# Patient Record
Sex: FEMALE | Race: WHITE | Employment: FULL TIME | URBAN - METROPOLITAN AREA
[De-identification: names, ages, dates, MRNs, and addresses within clinical notes are randomized per-mention and may not be internally consistent; named-entity substitution may affect disease eponyms.]

---

## 2021-06-07 ENCOUNTER — OFFICE VISIT (OUTPATIENT)
Dept: FAMILY MEDICINE CLINIC | Age: 41
End: 2021-06-07
Payer: COMMERCIAL

## 2021-06-07 VITALS
DIASTOLIC BLOOD PRESSURE: 86 MMHG | TEMPERATURE: 97.6 F | HEIGHT: 60 IN | HEART RATE: 89 BPM | OXYGEN SATURATION: 97 % | WEIGHT: 117 LBS | BODY MASS INDEX: 22.97 KG/M2 | RESPIRATION RATE: 17 BRPM | SYSTOLIC BLOOD PRESSURE: 131 MMHG

## 2021-06-07 DIAGNOSIS — L85.3 DRY SKIN: Primary | ICD-10-CM

## 2021-06-07 DIAGNOSIS — R42 DIZZINESS: ICD-10-CM

## 2021-06-07 DIAGNOSIS — L67.9 HAIR CHANGES: ICD-10-CM

## 2021-06-07 PROCEDURE — 99203 OFFICE O/P NEW LOW 30 MIN: CPT | Performed by: FAMILY MEDICINE

## 2021-06-07 NOTE — PROGRESS NOTES
1. Have you been to the ER, urgent care clinic since your last visit? Hospitalized since your last visit? No    2. Have you seen or consulted any other health care providers outside of the [address] [place] [address] since your last visit? Include any pap smears or colon screening. Yes, Dermatology     Health Maintenance Due   Topic Date Due    Hepatitis C Screening  Never done    DTaP/Tdap/Td series (1 - Tdap) Never done    PAP AKA CERVICAL CYTOLOGY  Never done     Chief Complaint   Patient presents with    Establish Care     Pt presents to be having dry skin, and experience changes in her hair and pt states BP has been lower.

## 2021-06-07 NOTE — PROGRESS NOTES
Chief Complaint   Patient presents with   [facility].     Pt is seen today is establish care. Pt has been having symptoms of dry skin since 2017. Pt reports she has had dry skin, and experienced changes in her hair and pt states BP has been lower than normal on multiple recent evalautions. Pt has been to the dermatologist due to dry skin, also noted change in hair growth, grows in spirally. Pt has noted hair breakage. Pt has been struggling with dry scalp. First dermatologists diagnosed possible eczema, but second dermatologist said not eczema, recommended blood testing. Pt also started noticing horizontal ridges in nails, tougher and less bendable. Pt reports that her legs have become so dry that they get irritated with shaving, red bumps. Recently pt has had some episodes of lightheadedness, dizziness, some palpitations. Pt also has had several low BP recently. Pt is concerned about possible thyroid problems. Subjective: (As above and below)     Chief Complaint   Patient presents with   [facility].     she is a 45y.o. year old female who presents for evaluation. Reviewed PmHx, RxHx, FmHx, SocHx, AllgHx and updated in chart.     Review of Systems - negative except as listed above    Objective:     Vitals:    06/07/21 1438   BP: 131/86   Pulse: 89   Resp: 17   Temp: 97.6 °F (36.4 °C)   TempSrc: Oral   SpO2: 97%   Weight: 117 lb (53.1 kg)   Height: 5' 0.25\" (1.53 m)     Physical Examination: General appearance - alert, well appearing, and in no distress  Mental status - normal mood, behavior, speech, dress, motor activity, and thought processes  Ears - bilateral TM's and external ear canals normal  Chest - clear to auscultation, no wheezes, rales or rhonchi, symmetric air entry  Heart - normal rate, regular rhythm, normal S1, S2, no murmurs, rubs, clicks or gallops  Musculoskeletal - no joint tenderness, deformity or swelling  Extremities - peripheral pulses normal, no pedal edema, no clubbing or cyanosis  Skin - dry skin on legs, some sandpapery erythema on left calf  Assessment/ Plan:   1. Dry skin  -check labs  - METABOLIC PANEL, COMPREHENSIVE; Future  - CBC W/O DIFF; Future  - VITAMIN D, 25 HYDROXY; Future  - THYROID CASCADE PROFILE; Future  - IRON PROFILE; Future  - FERRITIN; Future    2. Hair changes  - METABOLIC PANEL, COMPREHENSIVE; Future  - CBC W/O DIFF; Future  - VITAMIN D, 25 HYDROXY; Future  - THYROID CASCADE PROFILE; Future  - IRON PROFILE; Future  - FERRITIN; Future    3. Dizziness  - METABOLIC PANEL, COMPREHENSIVE; Future  - CBC W/O DIFF; Future  - VITAMIN D, 25 HYDROXY; Future  - THYROID CASCADE PROFILE; Future  - IRON PROFILE; Future  - FERRITIN; Future       I have discussed the diagnosis with the patient and the intended plan as seen in the above orders. The patient has received an after-visit summary and questions were answered concerning future plans.      Medication Side Effects and Warnings were discussed with patient: yes  Patient Labs were reviewed: yes  Patient Past Records were reviewed:  yes    Dana Bo M.D.

## 2021-06-08 LAB
25(OH)D3 SERPL-MCNC: 21.4 NG/ML (ref 30–100)
ALBUMIN SERPL-MCNC: 3.9 G/DL (ref 3.5–5)
ALBUMIN/GLOB SERPL: 1.1 {RATIO} (ref 1.1–2.2)
ALP SERPL-CCNC: 40 U/L (ref 45–117)
ALT SERPL-CCNC: 22 U/L (ref 12–78)
ANION GAP SERPL CALC-SCNC: 6 MMOL/L (ref 5–15)
AST SERPL-CCNC: 22 U/L (ref 15–37)
BILIRUB SERPL-MCNC: 0.3 MG/DL (ref 0.2–1)
BUN SERPL-MCNC: 18 MG/DL (ref 6–20)
BUN/CREAT SERPL: 22 (ref 12–20)
CALCIUM SERPL-MCNC: 9.4 MG/DL (ref 8.5–10.1)
CHLORIDE SERPL-SCNC: 107 MMOL/L (ref 97–108)
CO2 SERPL-SCNC: 26 MMOL/L (ref 21–32)
CREAT SERPL-MCNC: 0.82 MG/DL (ref 0.55–1.02)
ERYTHROCYTE [DISTWIDTH] IN BLOOD BY AUTOMATED COUNT: 11.9 % (ref 11.5–14.5)
FERRITIN SERPL-MCNC: 73 NG/ML (ref 8–252)
GLOBULIN SER CALC-MCNC: 3.5 G/DL (ref 2–4)
GLUCOSE SERPL-MCNC: 82 MG/DL (ref 65–100)
HCT VFR BLD AUTO: 42 % (ref 35–47)
HCV AB SERPL QL IA: NONREACTIVE
HCV COMMENT,HCGAC: NORMAL
HGB BLD-MCNC: 14.1 G/DL (ref 11.5–16)
IRON SATN MFR SERPL: 30 % (ref 20–50)
IRON SERPL-MCNC: 90 UG/DL (ref 35–150)
MCH RBC QN AUTO: 30.7 PG (ref 26–34)
MCHC RBC AUTO-ENTMCNC: 33.6 G/DL (ref 30–36.5)
MCV RBC AUTO: 91.5 FL (ref 80–99)
NRBC # BLD: 0 K/UL (ref 0–0.01)
NRBC BLD-RTO: 0 PER 100 WBC
PLATELET # BLD AUTO: 153 K/UL (ref 150–400)
PMV BLD AUTO: 12 FL (ref 8.9–12.9)
POTASSIUM SERPL-SCNC: 3.9 MMOL/L (ref 3.5–5.1)
PROT SERPL-MCNC: 7.4 G/DL (ref 6.4–8.2)
RBC # BLD AUTO: 4.59 M/UL (ref 3.8–5.2)
SODIUM SERPL-SCNC: 139 MMOL/L (ref 136–145)
TIBC SERPL-MCNC: 299 UG/DL (ref 250–450)
WBC # BLD AUTO: 5.8 K/UL (ref 3.6–11)

## 2021-06-09 LAB — TSH SERPL-ACNC: 3.81 UIU/ML (ref 0.45–4.5)

## 2021-06-11 ENCOUNTER — TELEPHONE (OUTPATIENT)
Dept: FAMILY MEDICINE CLINIC | Age: 41
End: 2021-06-11

## 2021-06-11 DIAGNOSIS — L67.9 HAIR CHANGES: ICD-10-CM

## 2021-06-11 DIAGNOSIS — L85.3 DRY SKIN: Primary | ICD-10-CM

## 2021-06-11 NOTE — TELEPHONE ENCOUNTER
Patient calling to get lab results, says that she doesn't know what to do after reading results in [facility].  Please call her at [phone]

## 2021-06-11 NOTE — TELEPHONE ENCOUNTER
verified. Pt wants to know what is the next steps, since she is still feeling the same way that was discussed at visit. She was wondering if additional lab testing is needed for the thyroid r/t family hx. She discussed something about secondary thyroid symptom.

## 2021-06-14 ENCOUNTER — TELEPHONE (OUTPATIENT)
Dept: FAMILY MEDICINE CLINIC | Age: 41
End: 2021-06-14

## 2021-06-14 NOTE — TELEPHONE ENCOUNTER
Please advise pt that other than low vitamin D levels no other abnormalities were found. Recommend endocrinology referral for further evaluation of symptoms. Referral written.

## 2021-06-14 NOTE — TELEPHONE ENCOUNTER
Pt called to update her email address to send referral to. It should be [email or web address]    Also please call pt to discuss last visit. Pt read over AVS and feels that she's being referred for further thyroid testing because she asked for it. Pt wants to make sure that Dr. Kaden Tipton is on the same page as far as next steps for treatment.

## 2021-06-14 NOTE — TELEPHONE ENCOUNTER
Pt notified and voiced understanding. Pt requested referral to be emailed to [email or web address]

## 2023-03-01 ENCOUNTER — TELEPHONE (OUTPATIENT)
Dept: FAMILY MEDICINE CLINIC | Age: 43
End: 2023-03-01

## 2023-03-01 ENCOUNTER — OFFICE VISIT (OUTPATIENT)
Dept: FAMILY MEDICINE CLINIC | Age: 43
End: 2023-03-01
Payer: COMMERCIAL

## 2023-03-01 VITALS
HEIGHT: 60 IN | OXYGEN SATURATION: 98 % | BODY MASS INDEX: 22.1 KG/M2 | TEMPERATURE: 97 F | RESPIRATION RATE: 16 BRPM | WEIGHT: 112.6 LBS | HEART RATE: 83 BPM | SYSTOLIC BLOOD PRESSURE: 122 MMHG | DIASTOLIC BLOOD PRESSURE: 84 MMHG

## 2023-03-01 DIAGNOSIS — E53.8 B12 DEFICIENCY: ICD-10-CM

## 2023-03-01 DIAGNOSIS — E55.9 VITAMIN D DEFICIENCY: ICD-10-CM

## 2023-03-01 DIAGNOSIS — R09.81 SINUS CONGESTION: Primary | ICD-10-CM

## 2023-03-01 PROCEDURE — 99213 OFFICE O/P EST LOW 20 MIN: CPT | Performed by: FAMILY MEDICINE

## 2023-03-01 RX ORDER — FEXOFENADINE HCL AND PSEUDOEPHEDRINE HCI 180; 240 MG/1; MG/1
1 TABLET, EXTENDED RELEASE ORAL DAILY
COMMUNITY

## 2023-03-01 RX ORDER — LANOLIN ALCOHOL/MO/W.PET/CERES
1 CREAM (GRAM) TOPICAL DAILY
COMMUNITY

## 2023-03-01 NOTE — TELEPHONE ENCOUNTER
----- Message from Lauren Billingsley sent at 2/28/2023  4:48 PM EST -----  Subject: Referral Request    Reason for referral request? Would like bloodwork done at her appointment   on 03/01/2023  Provider patient wants to be referred to(if known):     Provider Phone Number(if known):     Additional Information for Provider?   ---------------------------------------------------------------------------  --------------  [address] [facility]    [phone]; OK to leave message on voicemail  ---------------------------------------------------------------------------  --------------

## 2023-03-01 NOTE — PROGRESS NOTES
Health Maintenance Due   Topic Date Due    DTaP/Tdap/Td series (1 - Tdap) Never done    Cervical cancer screen  Never done    COVID-19 Vaccine (5 - Booster) 07/09/2021    Lipid Screen  08/11/2021    Depression Screen  06/07/2022    Flu Vaccine (1) Never done     1. \"Have you been to the ER, urgent care clinic since your last visit? Hospitalized since your last visit? \" No    2. \"Have you seen or consulted any other health care providers outside of the [address] [place] [address] since your last visit? \"  Yes. Endocrinology      3. For patients aged 39-70: Has the patient had a colonoscopy / FIT/ Cologuard? NA - based on age      If the patient is female:    4. For patients aged 41-77: Has the patient had a mammogram within the past 2 years? Yes - Care Gap present. Most recent result on file      5. For patients aged 21-65: Has the patient had a pap smear? Yes - Care Gap present. Rooming MA/LPN to request most recent results  Yes. [facility].  Pt decline record release

## 2023-03-01 NOTE — PROGRESS NOTES
HPI  Monique Schwab is a 40 y.o. female who presents with a concern about brown mucus that she is hocking up in the morning. Present for 2 months but went away toward the beginning of the course. Then a few weeks ago became a more consistent daily phenomenon. Will wake up in the morning with a perception that there is a lump stuck in her throat or in her chest.  Will Hock up a brown luggie with chunks in it and relieve the lump feeling. Sometimes it is harder than others to bring this up. She is careful to note that she is not coughing this up. There is no cough. There is no perceived sinus pressure pain or nasal congestion. As far she can tell she does not have a postnasal drip. This is not exactly her allergy season, typically has trouble in the April and fall. She is currently taking Allegra-D. She has a long history of sinus infections does not feel like she is dealing with a sinus infection today    PMHx:  Past Medical History:   Diagnosis Date    Environmental allergies        Meds:   Current Outpatient Medications   Medication Sig Dispense Refill    fexofenadine-pseudoephedrine (Allegra-D 24 Hour) 180-240 mg per tablet Take 1 Tablet by mouth daily. cyanocobalamin 1,000 mcg tablet Take 1 Tablet by mouth daily. ergocalciferol, vitamin D2, (VITAMIN D2 PO) Take  by mouth daily. multivitamins chew Take  by mouth daily. norgestimate-ethinyl estradioL (ORTHO TRI-CYCLEN, TRI-SPRINTEC) 0.18/0.215/0.25 mg-35 mcg (28) tab Take 1 Tab by mouth. fluticasone (VERAMYST) 27.5 mcg/actuation nasal spray 2 Sprays by Nasal route daily. (Patient not taking: Reported on 3/1/2023)         Allergies:    Allergies   Allergen Reactions    Cephalexin Swelling     Pt states swelling to eyes    Pcn [Penicillins] Unknown (comments)     Pt states she doesn't know what it does to her    Sulfur Unknown (comments)     Pt doesn't know what it does to her    Tetracycline Swelling       Smoker:  Social History     Tobacco Use   Smoking Status Never   Smokeless Tobacco Never       ETOH:   Social History     Substance and Sexual Activity   Alcohol Use Not Currently    Comment: occassional       FH:   Family History   Problem Relation Age of Onset    Hypertension Mother     Diabetes Mother     Asthma Mother     Heart Disease Mother     Diabetes Father        ROS:   As listed in HPI. In addition:  Constitutional:   No headache, fever, fatigue, weight loss or weight gain      Cardiac:    No chest pain      Resp:   No cough or shortness of breath      Neuro   No loss of consciousness, dizziness, seizures      Physical Exam:  Blood pressure 122/84, pulse 83, temperature 97 °F (36.1 °C), temperature source Temporal, resp. rate 16, height 5' 0.25\" (1.53 m), weight 112 lb 9.6 oz (51.1 kg), last menstrual period 03/01/2023, SpO2 98 %. GEN: No apparent distress. Alert and oriented and responds to all questions appropriately. NEUROLOGIC:  No focal neurologic deficits. Strength and sensation grossly intact. Coordination and gait grossly intact. EXT: Well perfused. No edema. SKIN: No obvious rashes. Lungs clear to auscultation bilaterally  CV regular rate rhythm no murmur  HEENT clear panic membrane  Septum is deviated to the left. The middle turbinate on the left is quite congested, slightly erythematous, right middle turbinate is slightly pink, boggy. There is significant postnasal drip. There are shotty lymphadenopathy       Assessment and Plan     Sinus congestion without sinus infection  Based on her description she has bring mucus up from her throat, not her lungs  The color is most likely the age/hydration level of the mucus. There is no sign of infection so provided some reassurance. However there is evidence that her nose is more congested than she can perceive.   Particularly the left middle sinus and possibly the left frontal sinus are probably spending a lot of time congested  Continue your Allegra  Consider phenylephrine  Try about using Flonase. Thinks this has affected your sense of smell due to overuse in the past  Nasal saline  [place] pot    Provided reassurance, if she would like to look further into this would refer to ENT. Has had low vitamin D and B12. B12 supplement in particular has solved a lot of problems that she felt she was having in the past.  She is interested in getting her levels checked today      ICD-10-CM ICD-9-CM    1. Sinus congestion  R09.81 478.19       2. B12 deficiency  E53.8 266.2 VITAMIN B12 & FOLATE      VITAMIN B12 & FOLATE      3. Vitamin D deficiency  E55.9 268.9 VITAMIN D, 25 HYDROXY      VITAMIN D, 25 HYDROXY          AVS given.  Pt expressed understanding of instructions

## 2023-03-02 LAB
25(OH)D3 SERPL-MCNC: 64.1 NG/ML (ref 30–100)
FOLATE SERPL-MCNC: 20.7 NG/ML (ref 5–21)
VIT B12 SERPL-MCNC: 708 PG/ML (ref 193–986)